# Patient Record
Sex: MALE | Race: WHITE | URBAN - NONMETROPOLITAN AREA
[De-identification: names, ages, dates, MRNs, and addresses within clinical notes are randomized per-mention and may not be internally consistent; named-entity substitution may affect disease eponyms.]

---

## 2017-01-03 ENCOUNTER — OFFICE VISIT (OUTPATIENT)
Dept: CARDIOLOGY CLINIC | Age: 82
End: 2017-01-03

## 2017-01-03 VITALS
HEART RATE: 76 BPM | HEIGHT: 66 IN | WEIGHT: 257 LBS | BODY MASS INDEX: 41.3 KG/M2 | DIASTOLIC BLOOD PRESSURE: 60 MMHG | SYSTOLIC BLOOD PRESSURE: 138 MMHG

## 2017-01-03 DIAGNOSIS — E78.5 HYPERLIPIDEMIA, UNSPECIFIED HYPERLIPIDEMIA TYPE: Primary | Chronic | ICD-10-CM

## 2017-01-03 PROCEDURE — 99214 OFFICE O/P EST MOD 30 MIN: CPT | Performed by: INTERNAL MEDICINE

## 2017-03-07 ENCOUNTER — HOSPITAL ENCOUNTER (OUTPATIENT)
Dept: OTHER | Age: 82
Discharge: HOME | End: 2017-03-07

## 2017-03-07 DIAGNOSIS — Z00.6 CLINICAL TRIAL EXAM: ICD-10-CM

## 2017-03-07 LAB
A/G RATIO: 1.8 (ref 1.1–2.2)
ALBUMIN SERPL-MCNC: 4.2 G/DL (ref 3.4–5)
ALP BLD-CCNC: 87 U/L (ref 40–129)
ALT SERPL-CCNC: 20 U/L (ref 10–40)
ANION GAP SERPL CALCULATED.3IONS-SCNC: 16 MMOL/L (ref 3–16)
AST SERPL-CCNC: 18 U/L (ref 15–37)
BASOPHILS ABSOLUTE: 0 K/UL (ref 0–0.2)
BASOPHILS RELATIVE PERCENT: 0.6 %
BILIRUB SERPL-MCNC: 0.7 MG/DL (ref 0–1)
BILIRUBIN URINE: NEGATIVE
BLOOD, URINE: NEGATIVE
BUN BLDV-MCNC: 14 MG/DL (ref 7–20)
CALCIUM SERPL-MCNC: 9.4 MG/DL (ref 8.3–10.6)
CHLORIDE BLD-SCNC: 99 MMOL/L (ref 99–110)
CHOLESTEROL, TOTAL: 123 MG/DL (ref 0–199)
CLARITY: CLEAR
CO2: 24 MMOL/L (ref 21–32)
COLOR: YELLOW
CREAT SERPL-MCNC: 1 MG/DL (ref 0.8–1.3)
EOSINOPHILS ABSOLUTE: 0.2 K/UL (ref 0–0.6)
EOSINOPHILS RELATIVE PERCENT: 2.9 %
GFR AFRICAN AMERICAN: >60
GFR NON-AFRICAN AMERICAN: >60
GLOBULIN: 2.3 G/DL
GLUCOSE BLD-MCNC: 124 MG/DL (ref 70–99)
GLUCOSE URINE: NEGATIVE MG/DL
HCT VFR BLD CALC: 40.6 % (ref 40.5–52.5)
HDLC SERPL-MCNC: 58 MG/DL (ref 40–60)
HEMOGLOBIN: 13.7 G/DL (ref 13.5–17.5)
KETONES, URINE: NEGATIVE MG/DL
LDL CHOLESTEROL CALCULATED: 41 MG/DL
LEUKOCYTE ESTERASE, URINE: NEGATIVE
LYMPHOCYTES ABSOLUTE: 2 K/UL (ref 1–5.1)
LYMPHOCYTES RELATIVE PERCENT: 30.9 %
MCH RBC QN AUTO: 30.8 PG (ref 26–34)
MCHC RBC AUTO-ENTMCNC: 33.6 G/DL (ref 31–36)
MCV RBC AUTO: 91.7 FL (ref 80–100)
MICROSCOPIC EXAMINATION: NORMAL
MONOCYTES ABSOLUTE: 0.4 K/UL (ref 0–1.3)
MONOCYTES RELATIVE PERCENT: 6.8 %
NEUTROPHILS ABSOLUTE: 3.8 K/UL (ref 1.7–7.7)
NEUTROPHILS RELATIVE PERCENT: 58.8 %
NITRITE, URINE: NEGATIVE
PDW BLD-RTO: 13.7 % (ref 12.4–15.4)
PH UA: 6
PLATELET # BLD: 230 K/UL (ref 135–450)
PMV BLD AUTO: 8.7 FL (ref 5–10.5)
POTASSIUM SERPL-SCNC: 4.2 MMOL/L (ref 3.5–5.1)
PROSTATE SPECIFIC ANTIGEN: 7.67 NG/ML (ref 0–4)
PROTEIN UA: NEGATIVE MG/DL
RBC # BLD: 4.43 M/UL (ref 4.2–5.9)
SODIUM BLD-SCNC: 139 MMOL/L (ref 136–145)
SPECIFIC GRAVITY UA: 1.02
TOTAL PROTEIN: 6.5 G/DL (ref 6.4–8.2)
TRIGL SERPL-MCNC: 118 MG/DL (ref 0–150)
URINE TYPE: NORMAL
UROBILINOGEN, URINE: 1 E.U./DL
VLDLC SERPL CALC-MCNC: 24 MG/DL
WBC # BLD: 6.4 K/UL (ref 4–11)

## 2017-03-08 LAB
ESTIMATED AVERAGE GLUCOSE: 137 MG/DL
HBA1C MFR BLD: 6.4 %

## 2017-09-25 ENCOUNTER — OFFICE VISIT (OUTPATIENT)
Dept: CARDIOLOGY CLINIC | Age: 82
End: 2017-09-25

## 2017-09-25 VITALS
HEART RATE: 68 BPM | BODY MASS INDEX: 41.12 KG/M2 | WEIGHT: 262 LBS | DIASTOLIC BLOOD PRESSURE: 68 MMHG | SYSTOLIC BLOOD PRESSURE: 138 MMHG | HEIGHT: 67 IN

## 2017-09-25 DIAGNOSIS — I10 ESSENTIAL HYPERTENSION: Chronic | ICD-10-CM

## 2017-09-25 DIAGNOSIS — I25.10 CORONARY ARTERY DISEASE INVOLVING NATIVE CORONARY ARTERY OF NATIVE HEART WITHOUT ANGINA PECTORIS: Primary | Chronic | ICD-10-CM

## 2017-09-25 DIAGNOSIS — E78.2 MIXED HYPERLIPIDEMIA: Chronic | ICD-10-CM

## 2017-09-25 PROCEDURE — G8598 ASA/ANTIPLAT THER USED: HCPCS | Performed by: INTERNAL MEDICINE

## 2017-09-25 PROCEDURE — G8427 DOCREV CUR MEDS BY ELIG CLIN: HCPCS | Performed by: INTERNAL MEDICINE

## 2017-09-25 PROCEDURE — G8417 CALC BMI ABV UP PARAM F/U: HCPCS | Performed by: INTERNAL MEDICINE

## 2017-09-25 PROCEDURE — 4040F PNEUMOC VAC/ADMIN/RCVD: CPT | Performed by: INTERNAL MEDICINE

## 2017-09-25 PROCEDURE — 1036F TOBACCO NON-USER: CPT | Performed by: INTERNAL MEDICINE

## 2017-09-25 PROCEDURE — 99213 OFFICE O/P EST LOW 20 MIN: CPT | Performed by: INTERNAL MEDICINE

## 2017-09-25 PROCEDURE — 1123F ACP DISCUSS/DSCN MKR DOCD: CPT | Performed by: INTERNAL MEDICINE

## 2017-09-25 RX ORDER — AMLODIPINE BESYLATE 5 MG/1
5 TABLET ORAL DAILY
Qty: 30 TABLET | Refills: 11
Start: 2017-09-25 | End: 2021-05-07

## 2017-09-25 RX ORDER — LISINOPRIL 20 MG/1
20 TABLET ORAL DAILY
Qty: 30 TABLET | Refills: 11
Start: 2017-09-25

## 2018-03-07 ENCOUNTER — HOSPITAL ENCOUNTER (OUTPATIENT)
Dept: OTHER | Age: 83
Discharge: HOME | End: 2018-03-07

## 2018-03-07 DIAGNOSIS — Z00.6 CLINICAL TRIAL EXAM: ICD-10-CM

## 2018-03-07 LAB
A/G RATIO: 2 (ref 1.1–2.2)
ALBUMIN SERPL-MCNC: 4.2 G/DL (ref 3.4–5)
ALP BLD-CCNC: 90 U/L (ref 40–129)
ALT SERPL-CCNC: 16 U/L (ref 10–40)
ANION GAP SERPL CALCULATED.3IONS-SCNC: 12 MMOL/L (ref 3–16)
AST SERPL-CCNC: 15 U/L (ref 15–37)
BASOPHILS ABSOLUTE: 0 K/UL (ref 0–0.2)
BASOPHILS RELATIVE PERCENT: 0.5 %
BILIRUB SERPL-MCNC: 0.6 MG/DL (ref 0–1)
BILIRUBIN URINE: NEGATIVE
BLOOD, URINE: NEGATIVE
BUN BLDV-MCNC: 12 MG/DL (ref 7–20)
CALCIUM SERPL-MCNC: 9.1 MG/DL (ref 8.3–10.6)
CHLORIDE BLD-SCNC: 102 MMOL/L (ref 99–110)
CHOLESTEROL, TOTAL: 129 MG/DL (ref 0–199)
CLARITY: CLEAR
CO2: 26 MMOL/L (ref 21–32)
COLOR: YELLOW
CREAT SERPL-MCNC: 0.8 MG/DL (ref 0.8–1.3)
EOSINOPHILS ABSOLUTE: 0.2 K/UL (ref 0–0.6)
EOSINOPHILS RELATIVE PERCENT: 2.6 %
GFR AFRICAN AMERICAN: >60
GFR NON-AFRICAN AMERICAN: >60
GLOBULIN: 2.1 G/DL
GLUCOSE BLD-MCNC: 135 MG/DL (ref 70–99)
GLUCOSE URINE: NEGATIVE MG/DL
HCT VFR BLD CALC: 39.9 % (ref 40.5–52.5)
HDLC SERPL-MCNC: 68 MG/DL (ref 40–60)
HEMOGLOBIN: 13.6 G/DL (ref 13.5–17.5)
KETONES, URINE: NEGATIVE MG/DL
LDL CHOLESTEROL CALCULATED: 43 MG/DL
LEUKOCYTE ESTERASE, URINE: NEGATIVE
LYMPHOCYTES ABSOLUTE: 2.1 K/UL (ref 1–5.1)
LYMPHOCYTES RELATIVE PERCENT: 32 %
MCH RBC QN AUTO: 32.2 PG (ref 26–34)
MCHC RBC AUTO-ENTMCNC: 34.2 G/DL (ref 31–36)
MCV RBC AUTO: 94.1 FL (ref 80–100)
MICROSCOPIC EXAMINATION: NORMAL
MONOCYTES ABSOLUTE: 0.4 K/UL (ref 0–1.3)
MONOCYTES RELATIVE PERCENT: 6.7 %
NEUTROPHILS ABSOLUTE: 3.9 K/UL (ref 1.7–7.7)
NEUTROPHILS RELATIVE PERCENT: 58.2 %
NITRITE, URINE: NEGATIVE
PDW BLD-RTO: 14.8 % (ref 12.4–15.4)
PH UA: 7
PLATELET # BLD: 284 K/UL (ref 135–450)
PMV BLD AUTO: 8.5 FL (ref 5–10.5)
POTASSIUM SERPL-SCNC: 4.5 MMOL/L (ref 3.5–5.1)
PROSTATE SPECIFIC ANTIGEN: 11.79 NG/ML (ref 0–4)
PROTEIN UA: NEGATIVE MG/DL
RBC # BLD: 4.23 M/UL (ref 4.2–5.9)
SODIUM BLD-SCNC: 140 MMOL/L (ref 136–145)
SPECIFIC GRAVITY UA: 1.02
TOTAL PROTEIN: 6.3 G/DL (ref 6.4–8.2)
TRIGL SERPL-MCNC: 90 MG/DL (ref 0–150)
URINE TYPE: NORMAL
UROBILINOGEN, URINE: 1 E.U./DL
VLDLC SERPL CALC-MCNC: 18 MG/DL
WBC # BLD: 6.7 K/UL (ref 4–11)

## 2018-03-08 LAB
ESTIMATED AVERAGE GLUCOSE: 131.2 MG/DL
HBA1C MFR BLD: 6.2 %

## 2018-03-30 ENCOUNTER — OFFICE VISIT (OUTPATIENT)
Dept: CARDIOLOGY CLINIC | Age: 83
End: 2018-03-30

## 2018-03-30 VITALS
DIASTOLIC BLOOD PRESSURE: 68 MMHG | WEIGHT: 263.1 LBS | SYSTOLIC BLOOD PRESSURE: 126 MMHG | HEART RATE: 61 BPM | BODY MASS INDEX: 41.29 KG/M2 | HEIGHT: 67 IN

## 2018-03-30 DIAGNOSIS — Z01.818 PREOPERATIVE CLEARANCE: Primary | ICD-10-CM

## 2018-03-30 DIAGNOSIS — I25.10 CORONARY ARTERY DISEASE INVOLVING NATIVE CORONARY ARTERY OF NATIVE HEART WITHOUT ANGINA PECTORIS: Chronic | ICD-10-CM

## 2018-03-30 DIAGNOSIS — Z01.810 PREOP CARDIOVASCULAR EXAM: ICD-10-CM

## 2018-03-30 DIAGNOSIS — I10 ESSENTIAL HYPERTENSION: Chronic | ICD-10-CM

## 2018-03-30 DIAGNOSIS — E78.2 MIXED HYPERLIPIDEMIA: Chronic | ICD-10-CM

## 2018-03-30 DIAGNOSIS — R06.02 SHORTNESS OF BREATH: ICD-10-CM

## 2018-03-30 PROCEDURE — G8427 DOCREV CUR MEDS BY ELIG CLIN: HCPCS | Performed by: INTERNAL MEDICINE

## 2018-03-30 PROCEDURE — 4040F PNEUMOC VAC/ADMIN/RCVD: CPT | Performed by: INTERNAL MEDICINE

## 2018-03-30 PROCEDURE — G8598 ASA/ANTIPLAT THER USED: HCPCS | Performed by: INTERNAL MEDICINE

## 2018-03-30 PROCEDURE — 1036F TOBACCO NON-USER: CPT | Performed by: INTERNAL MEDICINE

## 2018-03-30 PROCEDURE — 99215 OFFICE O/P EST HI 40 MIN: CPT | Performed by: INTERNAL MEDICINE

## 2018-03-30 PROCEDURE — 1123F ACP DISCUSS/DSCN MKR DOCD: CPT | Performed by: INTERNAL MEDICINE

## 2018-03-30 PROCEDURE — G8417 CALC BMI ABV UP PARAM F/U: HCPCS | Performed by: INTERNAL MEDICINE

## 2018-03-30 PROCEDURE — 93000 ELECTROCARDIOGRAM COMPLETE: CPT | Performed by: INTERNAL MEDICINE

## 2018-03-30 PROCEDURE — G8484 FLU IMMUNIZE NO ADMIN: HCPCS | Performed by: INTERNAL MEDICINE

## 2018-03-30 RX ORDER — AMLODIPINE BESYLATE 5 MG/1
10 TABLET ORAL DAILY
Qty: 180 TABLET | Refills: 3 | Status: CANCELLED | OUTPATIENT
Start: 2018-03-30

## 2018-03-30 RX ORDER — LISINOPRIL 20 MG/1
40 TABLET ORAL DAILY
Qty: 180 TABLET | Refills: 3 | Status: CANCELLED | OUTPATIENT
Start: 2018-03-30

## 2018-03-30 RX ORDER — LANOLIN ALCOHOL/MO/W.PET/CERES
1000 CREAM (GRAM) TOPICAL DAILY
COMMUNITY

## 2018-03-30 RX ORDER — FERROUS SULFATE 325(65) MG
325 TABLET ORAL
COMMUNITY

## 2018-03-30 NOTE — COMMUNICATION BODY
anxiety, no depression. · Endocrine: No malaise, fatigue or temperature intolerance. No excessive thirst, fluid intake, or urination. No tremor. · Hematologic/Lymphatic: No abnormal bruising or bleeding, blood clots or swollen lymph nodes. · Allergic/Immunologic: No nasal congestion or hives. Physical Examination:    Vitals:    03/30/18 0800   BP: 126/68   Pulse: 61          Constitutional and General Appearance:   . NAD   SKIN:  .     Warm and dry  EYES:    .     EOMI  Neck:   . Normal carotid contour  Respiratory:  · Normal excursion and expansion without use of accessory muscles  · Resp Auscultation: Normal breath sounds without dullness  Cardiovascular:  · The apical impulses not displaced  · Heart tones are crisp and normal  · Cervical veins are not engorged  · Normal S1S2, No S3, ANDREW-LVOT  · Peripheral pulses are symmetrical and full  Extremities:  · There is no clubbing, cyanosis of the extremities. · No edema  · Femoral Arteries: 2+ and equal  · Pedal Pulses: 2+ and equal   Abdomen:  · No masses or tenderness  · Liver/Spleen: No Abnormalities Noted  Neurological/Psychiatric:  · Alert and oriented in all spheres  · Moves all extremities well  · Exhibits normal gait balance and coordination  · No abnormalities of mood, affect, memory, mentation, or behavior are noted    7/14-US of Abd--  midline structures, including the common bowel duct, pancreas,   and aorta are obscured by artifact from overlying bowel gas and mesh   hernia repair     2012 echo AV sclerosis mod LVH  RVSP 29 mmHg    Assessment:   Problem: Coronary artery disease  1998, cardiac catheterization. 40% distal right coronary lesion, 60% mid left anterior descending lesion, 70% diagonal lesion, normal left ventricular systolic pressure  November, 2009 Myoview stress test>  5.8 min. Cristobal protocol. LVEF 77%.   Myocardial perfusion was normal.   8/12 myoview-normal, with normal EF    Problem: Hypertension  /68 (Site: Left Arm, Position: Sitting, Cuff Size: Large Adult)   Pulse 61   Ht 5' 7\" (1.702 m)   Wt 263 lb 1.6 oz (119.3 kg)   BMI 41.21 kg/m²         Problem: Dyslipidemia  3/19 tc 129  Tri 90 htl 68 ldl 43  Take coenzyme q 10 400 mg daily to decrease fatigue and aches that could be caused by your statin. This can be found at any local pharmacy, no prescription needed      Plan  Stress Myoview--If nonischemic, OK to proceed with prostate surgery  Echo-- Evaluate systolic murmur  Co Q trial  OV X 12 months    Thank you for allowing me to participate in the care of this individual.      Ernesto Salomon M.D., [facility] [place].

## 2018-04-02 ENCOUNTER — HOSPITAL ENCOUNTER (OUTPATIENT)
Dept: NON INVASIVE DIAGNOSTICS | Age: 83
Discharge: HOME | End: 2018-04-02
Attending: INTERNAL MEDICINE | Admitting: INTERNAL MEDICINE

## 2018-04-02 DIAGNOSIS — R06.02 SHORTNESS OF BREATH: ICD-10-CM

## 2018-04-02 LAB
LEFT VENTRICULAR EJECTION FRACTION HIGH VALUE: 55 %
LEFT VENTRICULAR EJECTION FRACTION MODE: NORMAL
LV EF: 55 %
LV EF: 66 %
LVEF MODALITY: NORMAL
LVEF MODALITY: NORMAL

## 2018-04-29 PROBLEM — Z01.818 PREOPERATIVE CLEARANCE: Status: RESOLVED | Noted: 2018-03-30 | Resolved: 2018-04-29

## 2019-05-02 NOTE — PROGRESS NOTES
differently: Take 10 mg by mouth daily ) 30 tablet 11    fluticasone (FLONASE) 50 MCG/ACT nasal spray 1 spray by Nasal route as needed for Rhinitis      atorvastatin (LIPITOR) 10 MG tablet Take 0.5 tablets by mouth every other day 30 tablet 0    isosorbide mononitrate (IMDUR) 60 MG CR tablet Take 60 mg by mouth daily.  furosemide (LASIX) 20 MG tablet Take 1 tablet by mouth daily. 90 tablet 3    terazosin (HYTRIN) 10 MG capsule Take 10 mg by mouth nightly.  triamcinolone (NASACORT AQ) 55 MCG/ACT nasal inhaler 2 sprays by Nasal route daily.  clopidogrel (PLAVIX) 75 MG tablet Take 75 mg by mouth daily.  metformin (GLUCOPHAGE) 500 MG tablet Take 500 mg by mouth every evening.  labetalol (NORMODYNE) 200 MG tablet Take 200 mg by mouth 2 times daily. No facility-administered encounter medications on file as of 5/3/2019. Allergies:  Penicillins     Review of Systems:   · Constitutional: there has been no unanticipated weight loss. There's been no change in energy level, sleep pattern, or activity level. · Eyes: No visual changes or diplopia. No scleral icterus. · ENT: No Headaches, hearing loss or vertigo. No mouth sores or sore throat. · Cardiovascular: Reviewed in HPI  · Respiratory: No cough or wheezing, no sputum production. No hematemesis. · Gastrointestinal: No abdominal pain, appetite loss, blood in stools. No change in bowel or bladder habits. · Genitourinary: No dysuria, trouble voiding, or hematuria. · Musculoskeletal:  No gait disturbance, weakness or joint complaints. · Integumentary: No rash or pruritis. · Neurological: No headache, diplopia, change in muscle strength, numbness or tingling. No change in gait, balance, coordination, mood, affect, memory, mentation, behavior. · Psychiatric: No anxiety, no depression. · Endocrine: No malaise, fatigue or temperature intolerance. No excessive thirst, fluid intake, or urination.  No tremor. · Hematologic/Lymphatic: No abnormal bruising or bleeding, blood clots or swollen lymph nodes. · Allergic/Immunologic: No nasal congestion or hives. Physical Examination:    Vitals:    05/03/19 0842   BP: 136/69   Pulse: 62          Constitutional and General Appearance:   . NAD   SKIN:  .     Warm and dry  EYES:    .     EOMI  Neck:   . Normal carotid contour  Respiratory:  Normal excursion and expansion without use of accessory muscles  Resp Auscultation: Normal breath sounds without dullness  Cardiovascular: The apical impulses not displaced  Heart tones are crisp and normal  Cervical veins are not engorged  Normal Y9Q2, No S3, systolic  Murmur  Peripheral pulses are symmetrical and full  Extremities: There is no clubbing, cyanosis of the extremities. No edema  Femoral Arteries: 2+ and equal  Pedal Pulses: 2+ and equal   Abdomen:  No masses or tenderness  Liver/Spleen: No Abnormalities Noted  Neurological/Psychiatric:  Alert and oriented in all spheres  Moves all extremities well  Exhibits normal gait balance and coordination  No abnormalities of mood, affect, memory    All testing and labs listed below were personally reviewed. 7/14-US of Abd--  midline structures, including the common bowel duct, pancreas,   and aorta are obscured by artifact from overlying bowel gas and mesh   hernia repair     2012 echo AV sclerosis mod LVH  RVSP 29 mmHg    Assessment:   CAD  1998, cardiac catheterization. 40% distal right coronary lesion, 60% mid left anterior descending lesion, 70% diagonal lesion, normal left ventricular systolic pressure  November, 2009 Myoview stress test>  5.8 min. Cristobal protocol. LVEF 77%.   Myocardial perfusion was normal.   4/2/18   myoview-normal, with normal EF  Stable,  Tolerating plavix and asa without  bleeding    HTN  /69 (Site: Left Upper Arm, Position: Sitting, Cuff Size: Medium Adult)   Pulse 62   Ht 5' 6\" (1.676 m)   Wt 270 lb (122.5 kg)   BMI 43.58 kg/m²    stable  Tolerating lisinopril and norvasc well         hchol  3/19  Tc 123  Tri 110  hdl 53 ldl 48  Liver enzymes stable  On lipitor 10 mg every other day  Stable, tolerating well      Aortic sclerosis  Systolic murmer  5953--[id number] aortic valve  Stable, no changes    Plan  Stable cardiac status  Continue all medications  Patient has been advised on the importance of regular exercise of at least 20-30 minutes daily alternating with aerobic and isometric activities. Follow up in one year    Thank you for allowing me to participate in the care of this individual.      Yulissa Boss M.D., [facility]. Scribe Attestation:  Scribe Attestation:  Heath Epperson am scribing for and in the presence of Anand Cadena MD.   Andrew Cody 05/03/19 9:00 AM   Provider Neha Moreira is working as a scribe for and in the presence of cecilio Cadena MD). Working as a scribe, Olga Marrykeli may have prepopulated components of this note with my historical  intellectual property under my direct supervision. Any additions to this intellectual property were performed in my presence and at my direction. Furthermore, the content and accuracy of this note have been reviewed by cecilio Cadena MD).

## 2019-05-03 ENCOUNTER — OFFICE VISIT (OUTPATIENT)
Dept: CARDIOLOGY CLINIC | Age: 84
End: 2019-05-03
Payer: MEDICARE

## 2019-05-03 VITALS
SYSTOLIC BLOOD PRESSURE: 136 MMHG | HEART RATE: 62 BPM | WEIGHT: 270 LBS | DIASTOLIC BLOOD PRESSURE: 69 MMHG | BODY MASS INDEX: 43.39 KG/M2 | HEIGHT: 66 IN

## 2019-05-03 DIAGNOSIS — I35.8 AORTIC VALVE SCLEROSIS: ICD-10-CM

## 2019-05-03 DIAGNOSIS — E78.2 MIXED HYPERLIPIDEMIA: ICD-10-CM

## 2019-05-03 DIAGNOSIS — I25.10 CORONARY ARTERY DISEASE INVOLVING NATIVE CORONARY ARTERY OF NATIVE HEART WITHOUT ANGINA PECTORIS: Primary | ICD-10-CM

## 2019-05-03 DIAGNOSIS — I10 ESSENTIAL HYPERTENSION: ICD-10-CM

## 2019-05-03 PROCEDURE — G8598 ASA/ANTIPLAT THER USED: HCPCS | Performed by: INTERNAL MEDICINE

## 2019-05-03 PROCEDURE — 4040F PNEUMOC VAC/ADMIN/RCVD: CPT | Performed by: INTERNAL MEDICINE

## 2019-05-03 PROCEDURE — 1036F TOBACCO NON-USER: CPT | Performed by: INTERNAL MEDICINE

## 2019-05-03 PROCEDURE — 99213 OFFICE O/P EST LOW 20 MIN: CPT | Performed by: INTERNAL MEDICINE

## 2019-05-03 PROCEDURE — G8427 DOCREV CUR MEDS BY ELIG CLIN: HCPCS | Performed by: INTERNAL MEDICINE

## 2019-05-03 PROCEDURE — 1123F ACP DISCUSS/DSCN MKR DOCD: CPT | Performed by: INTERNAL MEDICINE

## 2019-05-03 PROCEDURE — G8417 CALC BMI ABV UP PARAM F/U: HCPCS | Performed by: INTERNAL MEDICINE

## 2019-05-03 RX ORDER — GABAPENTIN 100 MG/1
100 CAPSULE ORAL 2 TIMES DAILY
COMMUNITY
Start: 2019-04-09 | End: 2021-05-07

## 2019-05-03 NOTE — LETTER
 Multiple Vitamins-Minerals (MULTIVITAL) TABS Take by mouth      vitamin B-12 (CYANOCOBALAMIN) 1000 MCG tablet Take 1,000 mcg by mouth daily      lisinopril (PRINIVIL;ZESTRIL) 20 MG tablet Take 1 tablet by mouth daily (Patient taking differently: Take 40 mg by mouth daily ) 30 tablet 11    amLODIPine (NORVASC) 5 MG tablet Take 1 tablet by mouth daily (Patient taking differently: Take 10 mg by mouth daily ) 30 tablet 11    fluticasone (FLONASE) 50 MCG/ACT nasal spray 1 spray by Nasal route as needed for Rhinitis      atorvastatin (LIPITOR) 10 MG tablet Take 0.5 tablets by mouth every other day 30 tablet 0    isosorbide mononitrate (IMDUR) 60 MG CR tablet Take 60 mg by mouth daily.  furosemide (LASIX) 20 MG tablet Take 1 tablet by mouth daily. 90 tablet 3    terazosin (HYTRIN) 10 MG capsule Take 10 mg by mouth nightly.  triamcinolone (NASACORT AQ) 55 MCG/ACT nasal inhaler 2 sprays by Nasal route daily.  clopidogrel (PLAVIX) 75 MG tablet Take 75 mg by mouth daily.  metformin (GLUCOPHAGE) 500 MG tablet Take 500 mg by mouth every evening.  labetalol (NORMODYNE) 200 MG tablet Take 200 mg by mouth 2 times daily. No facility-administered encounter medications on file as of 5/3/2019. Allergies:  Penicillins     Review of Systems:   · Constitutional: there has been no unanticipated weight loss. There's been no change in energy level, sleep pattern, or activity level. · Eyes: No visual changes or diplopia. No scleral icterus. · ENT: No Headaches, hearing loss or vertigo. No mouth sores or sore throat. · Cardiovascular: Reviewed in HPI  · Respiratory: No cough or wheezing, no sputum production. No hematemesis. · Gastrointestinal: No abdominal pain, appetite loss, blood in stools. No change in bowel or bladder habits. · Genitourinary: No dysuria, trouble voiding, or hematuria. · Musculoskeletal:  No gait disturbance, weakness or joint complaints. · Integumentary: No rash or pruritis. · Neurological: No headache, diplopia, change in muscle strength, numbness or tingling. No change in gait, balance, coordination, mood, affect, memory, mentation, behavior. · Psychiatric: No anxiety, no depression. · Endocrine: No malaise, fatigue or temperature intolerance. No excessive thirst, fluid intake, or urination. No tremor. · Hematologic/Lymphatic: No abnormal bruising or bleeding, blood clots or swollen lymph nodes. · Allergic/Immunologic: No nasal congestion or hives. Physical Examination:    Vitals:    05/03/19 0842   BP: 136/69   Pulse: 62          Constitutional and General Appearance:   . NAD   SKIN:  .     Warm and dry  EYES:    .     EOMI  Neck:   . Normal carotid contour  Respiratory:  Normal excursion and expansion without use of accessory muscles  Resp Auscultation: Normal breath sounds without dullness  Cardiovascular: The apical impulses not displaced  Heart tones are crisp and normal  Cervical veins are not engorged  Normal A4F5, No S3, systolic  Murmur  Peripheral pulses are symmetrical and full  Extremities: There is no clubbing, cyanosis of the extremities. No edema  Femoral Arteries: 2+ and equal  Pedal Pulses: 2+ and equal   Abdomen:  No masses or tenderness  Liver/Spleen: No Abnormalities Noted  Neurological/Psychiatric:  Alert and oriented in all spheres  Moves all extremities well  Exhibits normal gait balance and coordination  No abnormalities of mood, affect, memory    All testing and labs listed below were personally reviewed. 7/14-US of Abd--  midline structures, including the common bowel duct, pancreas,   and aorta are obscured by artifact from overlying bowel gas and mesh   hernia repair     2012 echo AV sclerosis mod LVH  RVSP 29 mmHg    Assessment:   CAD  1998, cardiac catheterization.   40% distal right coronary lesion, 60% mid left anterior descending lesion, 70% diagonal lesion, normal left ventricular systolic pressure  November, 2009 Myoview stress test>  5.8 min. Cristobal protocol. LVEF 77%. Myocardial perfusion was normal.   4/2/18   myoview-normal, with normal EF  Stable,  Tolerating plavix and asa without  bleeding    HTN  /69 (Site: Left Upper Arm, Position: Sitting, Cuff Size: Medium Adult)   Pulse 62   Ht 5' 6\" (1.676 m)   Wt 270 lb (122.5 kg)   BMI 43.58 kg/m²     stable  Tolerating lisinopril and norvasc well         hchol  3/19  Tc 123  Tri 110  hdl 53 ldl 48  Liver enzymes stable  On lipitor 10 mg every other day  Stable, tolerating well      Aortic sclerosis  Systolic murmer  2327--IVOOKTODM aortic valve  Stable, no changes    Plan  Stable cardiac status  Continue all medications  Patient has been advised on the importance of regular exercise of at least 20-30 minutes daily alternating with aerobic and isometric activities. Follow up in one year    Thank you for allowing me to participate in the care of this individual.      Anahy Gee M.D., [facility] [place]. Scribe Attestation:  Scribe Attestation:  Vijay Clayton am scribing for and in the presence of Indra Stein MD.   [facility] 05/03/19 9:00 AM   Provider Gabbi Huber is working as a scribe for and in the presence of cecilio Stein MD). Working as a scribe, Rosana Palacios may have prepopulated components of this note with my historical  intellectual property under my direct supervision. Any additions to this intellectual property were performed in my presence and at my direction. Furthermore, the content and accuracy of this note have been reviewed by cecilio Stein MD). If you have questions, please do not hesitate to call me. I look forward to following Alicia Contis along with you.     Sincerely,        Hermann Ramirez MD

## 2019-08-22 ENCOUNTER — OFFICE VISIT (OUTPATIENT)
Dept: ORTHOPEDIC SURGERY | Age: 84
End: 2019-08-22
Payer: MEDICARE

## 2019-08-22 VITALS — WEIGHT: 270.06 LBS | HEIGHT: 66 IN | BODY MASS INDEX: 43.4 KG/M2

## 2019-08-22 DIAGNOSIS — M25.521 RIGHT ELBOW PAIN: Primary | ICD-10-CM

## 2019-08-22 PROCEDURE — 99203 OFFICE O/P NEW LOW 30 MIN: CPT | Performed by: ORTHOPAEDIC SURGERY

## 2019-08-22 PROCEDURE — 1123F ACP DISCUSS/DSCN MKR DOCD: CPT | Performed by: ORTHOPAEDIC SURGERY

## 2019-08-22 PROCEDURE — G8417 CALC BMI ABV UP PARAM F/U: HCPCS | Performed by: ORTHOPAEDIC SURGERY

## 2019-08-22 PROCEDURE — G8427 DOCREV CUR MEDS BY ELIG CLIN: HCPCS | Performed by: ORTHOPAEDIC SURGERY

## 2019-08-22 PROCEDURE — G8598 ASA/ANTIPLAT THER USED: HCPCS | Performed by: ORTHOPAEDIC SURGERY

## 2019-08-22 PROCEDURE — 4040F PNEUMOC VAC/ADMIN/RCVD: CPT | Performed by: ORTHOPAEDIC SURGERY

## 2019-08-22 PROCEDURE — 1036F TOBACCO NON-USER: CPT | Performed by: ORTHOPAEDIC SURGERY

## 2019-08-22 NOTE — PROGRESS NOTES
bilaterally reveals all areas to be without enlargement or induration. Skin intact without lymphadenopathy, discoloration, or abnormal temperature. Well-healed medial elbow incision and palmar and distal volar wrist incision    Vascular: There is intact, symmetric circulation in both upper extremities. Capillary refill brisk in all fingers with 2+ palpable radial pulse  Musculoskeletal/Neurologic           Cervical spine and shoulders      Cervical spine, shoulders, elbows, wrist:  satisfactory comfortable baseline range of motion, strength, and stability  Negative Spurling sign bilateral upper extremity  2+ brachioradialis and triceps tendon reflexes negative Zachery sign bilaterally        Right hand(s) wrist(s)  Satisfactory range of motion bilaterally. There is not muscular atrophy. Wartenberg's sign is not present. Sensation is subjectively decreased, tingling in the Middle Finger, Ring Finger and Small Finger   Negative tinel's, nerve compression, and Phalen's sign at level of the wrist.           Contralateral hand(s) wrist(s) Satisfactory range of motion bilaterally. There is no muscular atrophy. Sensation is subjectively normal in the all fingers and entire hand  Negative tinel's, nerve compression, and Phalen's sign at level of the wrist             Right elbow(s)   Elbow range of motion approximately 5 to 135 degrees of flexion, no evidence of ulnar nerve subluxation or instability throughout elbow range of motion  70 degrees of pronation and supination without pain or mechanical symptoms. Mild positive Tinel's at the elbow with some sensitivity anterior the medial epicondyle. Mild positive elbow flexion test.  Absence of tenderness to palpation of medial epicondyle and common flexor origin. Contralateral elbow: Neg Tinel's at the elbow. Neg elbow flexion test. No pain to palpation.      strength: 59 pounds bilaterally      Radiology:     X-rays obtained and reviewed in see if any changes have occurred compared with last December. Finally, operative intervention could be considered particularly for his ulnar nerve transposition but I did discuss the increased risk of complications as well as less predictable results with repeat procedure particularly with ulnar nerve transposition but if we determined that this would be beneficial we could consider this in the future as well.     The patient has decided at this point he would like to continue to observe and I will plan to see him back in clinic in 6 to 8 weeks for repeat evaluation if he desires

## 2020-04-27 ENCOUNTER — TELEPHONE (OUTPATIENT)
Dept: CARDIOLOGY CLINIC | Age: 85
End: 2020-04-27

## 2020-04-27 NOTE — TELEPHONE ENCOUNTER
Pt asking if he needs blood work prior to 5/8/20 appt.  If so please send order to [facility] lab and call pt to advise

## 2020-05-07 ENCOUNTER — OFFICE VISIT (OUTPATIENT)
Dept: CARDIOLOGY CLINIC | Age: 85
End: 2020-05-07
Payer: MEDICARE

## 2020-05-07 VITALS
HEART RATE: 68 BPM | SYSTOLIC BLOOD PRESSURE: 118 MMHG | DIASTOLIC BLOOD PRESSURE: 60 MMHG | WEIGHT: 267 LBS | BODY MASS INDEX: 42.91 KG/M2 | TEMPERATURE: 97.2 F | HEIGHT: 66 IN

## 2020-05-07 PROCEDURE — 4040F PNEUMOC VAC/ADMIN/RCVD: CPT | Performed by: INTERNAL MEDICINE

## 2020-05-07 PROCEDURE — 93000 ELECTROCARDIOGRAM COMPLETE: CPT | Performed by: INTERNAL MEDICINE

## 2020-05-07 PROCEDURE — 99214 OFFICE O/P EST MOD 30 MIN: CPT | Performed by: INTERNAL MEDICINE

## 2020-05-07 PROCEDURE — G8417 CALC BMI ABV UP PARAM F/U: HCPCS | Performed by: INTERNAL MEDICINE

## 2020-05-07 PROCEDURE — 1123F ACP DISCUSS/DSCN MKR DOCD: CPT | Performed by: INTERNAL MEDICINE

## 2020-05-07 PROCEDURE — G8427 DOCREV CUR MEDS BY ELIG CLIN: HCPCS | Performed by: INTERNAL MEDICINE

## 2020-05-07 PROCEDURE — 1036F TOBACCO NON-USER: CPT | Performed by: INTERNAL MEDICINE

## 2020-05-07 NOTE — PATIENT INSTRUCTIONS
Echo in one year to evaluate AS  LLL  Stop plavix  Start eliquis 5mg twice a day  Call for excess bleeding or bruising  Follow up in one year with [facility]

## 2020-05-07 NOTE — PROGRESS NOTES
[address]   Cardiac Consultation    Referring Provider:  Tracy Marvin MD     Chief Complaint   Patient presents with    1 Year Follow Up     Pt denies cadiac SX.  Coronary Artery Disease    Hyperlipidemia    Hypertension       History of Present Illness:  Mr Esequiel Goldmann is a 82 y.o. male seen as a follow up for CAD,htn,hchol. He has aortic sclerosis     Today he states he is doing well. He has no resting or exertional chest pain, sob palpitations or dizziness. No syncope, swelling or bleeding problems. No routine exercise but is active. Patient is compliant  with medication and is tolerating them well without side effects  Patient has been advised on the importance of regular exercise of at least 20-30 minutes daily alternating with aerobic and isometric activities. Past Medical History:   has a past medical history of CAD (coronary artery disease), Heart murmur, Hypertension, Type II or unspecified type diabetes mellitus without mention of complication, not stated as uncontrolled, and Unspecified sleep apnea. Surgical History:   has a past surgical history that includes Cardiac catheterization; Total knee arthroplasty; hernia repair; Foot surgery; Arm Surgery (01/09/2018); and Carpal tunnel release (Right, 01/2019). Social History:   reports that he has never smoked. He has never used smokeless tobacco. He reports that he does not drink alcohol or use drugs. Family History:  family history includes Heart Disease in his father; Stroke in his mother.      Home Medications:  Outpatient Encounter Medications as of 5/7/2020   Medication Sig Dispense Refill    ferrous sulfate 325 (65 Fe) MG tablet Take 325 mg by mouth daily (with breakfast)      Multiple Vitamins-Minerals (MULTIVITAL) TABS Take by mouth      vitamin B-12 (CYANOCOBALAMIN) 1000 MCG tablet Take 1,000 mcg by mouth daily      lisinopril (PRINIVIL;ZESTRIL) 20 MG tablet Take 1 tablet by mouth daily (Patient taking

## 2020-05-15 LAB
BILIRUBIN DIRECT: 0.3 MG/DL (ref 0–0.2)
CHOLESTEROL, TOTAL: 111 MG/DL
HDLC SERPL-MCNC: 45 MG/DL
LDL CHOLESTEROL CALCULATED: 46 MG/DL
NONHDLC SERPL-MCNC: 66 MG/DL
PHOSPHORUS: 3.2 MG/DL (ref 2.5–4.5)
TRIGL SERPL-MCNC: 99 MG/DL

## 2020-05-21 ENCOUNTER — TELEPHONE (OUTPATIENT)
Dept: CARDIOLOGY CLINIC | Age: 85
End: 2020-05-21

## 2020-07-29 ENCOUNTER — TELEPHONE (OUTPATIENT)
Dept: CARDIOLOGY CLINIC | Age: 85
End: 2020-07-29

## 2020-07-29 NOTE — TELEPHONE ENCOUNTER
Pt calling he was put on Eliquis and he thinks his blood is too thin. He went to get labs done yesterday for Cancer Dr and when the lady pulled the needle he bleed a lot pt states that isn't normal for him. Needs to know what to do?  Pls call to advise Thank you

## 2020-07-29 NOTE — TELEPHONE ENCOUNTER
Called and discussed with patient--no active bleeding--just bled for a few minutes after blood draw. Discussed risk/benefit of Rx--He will monitor for signs of additional or active bleeding.

## 2020-10-01 ENCOUNTER — TELEPHONE (OUTPATIENT)
Dept: CARDIOLOGY CLINIC | Age: 85
End: 2020-10-01

## 2020-10-01 NOTE — TELEPHONE ENCOUNTER
Asking if it is ok for him to drive to [place] at the end of the month , he is on eliquis . He knows cate is not in office until next week . He will expect a call then .

## 2020-10-20 RX ORDER — APIXABAN 5 MG/1
TABLET, FILM COATED ORAL
Qty: 180 TABLET | Refills: 0 | Status: SHIPPED | OUTPATIENT
Start: 2020-10-20

## 2021-01-12 ENCOUNTER — TELEPHONE (OUTPATIENT)
Dept: CARDIOLOGY CLINIC | Age: 86
End: 2021-01-12

## 2021-01-12 DIAGNOSIS — I25.10 CORONARY ARTERY DISEASE DUE TO LIPID RICH PLAQUE: Primary | ICD-10-CM

## 2021-01-12 DIAGNOSIS — I25.83 CORONARY ARTERY DISEASE DUE TO LIPID RICH PLAQUE: Primary | ICD-10-CM

## 2021-01-12 NOTE — TELEPHONE ENCOUNTER
Received refill request for Eliquis from Francia Christiansen. Called patient. He states his PCP already filled it for him. He will be seeing Dr Walt Reinoso in May. He will need cbc and bmp prior to appt. Patient verbalized understanding. Mailed lab results to home address.

## 2021-05-07 ENCOUNTER — PROCEDURE VISIT (OUTPATIENT)
Dept: CARDIOLOGY CLINIC | Age: 86
End: 2021-05-07
Payer: MEDICARE

## 2021-05-07 ENCOUNTER — OFFICE VISIT (OUTPATIENT)
Dept: CARDIOLOGY CLINIC | Age: 86
End: 2021-05-07
Payer: MEDICARE

## 2021-05-07 VITALS
SYSTOLIC BLOOD PRESSURE: 116 MMHG | DIASTOLIC BLOOD PRESSURE: 56 MMHG | HEIGHT: 66 IN | HEART RATE: 54 BPM | BODY MASS INDEX: 41.3 KG/M2 | WEIGHT: 257 LBS

## 2021-05-07 DIAGNOSIS — I10 ESSENTIAL HYPERTENSION: Chronic | ICD-10-CM

## 2021-05-07 DIAGNOSIS — I10 HYPERTENSION, UNSPECIFIED TYPE: ICD-10-CM

## 2021-05-07 DIAGNOSIS — I70.90 AS (ATHEROSCLEROSIS): ICD-10-CM

## 2021-05-07 DIAGNOSIS — I48.91 ATRIAL FIBRILLATION, UNSPECIFIED TYPE (HCC): ICD-10-CM

## 2021-05-07 DIAGNOSIS — I25.10 CORONARY ARTERY DISEASE INVOLVING NATIVE HEART WITHOUT ANGINA PECTORIS, UNSPECIFIED VESSEL OR LESION TYPE: ICD-10-CM

## 2021-05-07 DIAGNOSIS — I35.9 AORTIC VALVE DISEASE: Primary | ICD-10-CM

## 2021-05-07 DIAGNOSIS — I25.10 CORONARY ARTERY DISEASE INVOLVING NATIVE CORONARY ARTERY OF NATIVE HEART WITHOUT ANGINA PECTORIS: Chronic | ICD-10-CM

## 2021-05-07 DIAGNOSIS — E78.2 MIXED HYPERLIPIDEMIA: Chronic | ICD-10-CM

## 2021-05-07 PROBLEM — I34.0 NONRHEUMATIC MITRAL VALVE REGURGITATION: Status: ACTIVE | Noted: 2021-05-07

## 2021-05-07 PROBLEM — I27.20 PULMONARY HTN (HCC): Status: ACTIVE | Noted: 2021-05-07

## 2021-05-07 LAB
LV EF: 55 %
LVEF MODALITY: NORMAL

## 2021-05-07 PROCEDURE — 93306 TTE W/DOPPLER COMPLETE: CPT | Performed by: INTERNAL MEDICINE

## 2021-05-07 PROCEDURE — 99214 OFFICE O/P EST MOD 30 MIN: CPT | Performed by: INTERNAL MEDICINE

## 2021-05-07 PROCEDURE — G8427 DOCREV CUR MEDS BY ELIG CLIN: HCPCS | Performed by: INTERNAL MEDICINE

## 2021-05-07 PROCEDURE — 4040F PNEUMOC VAC/ADMIN/RCVD: CPT | Performed by: INTERNAL MEDICINE

## 2021-05-07 PROCEDURE — 1123F ACP DISCUSS/DSCN MKR DOCD: CPT | Performed by: INTERNAL MEDICINE

## 2021-05-07 PROCEDURE — G8417 CALC BMI ABV UP PARAM F/U: HCPCS | Performed by: INTERNAL MEDICINE

## 2021-05-07 PROCEDURE — 1036F TOBACCO NON-USER: CPT | Performed by: INTERNAL MEDICINE

## 2021-05-07 RX ORDER — UBIDECARENONE 100 MG
100 CAPSULE ORAL DAILY
COMMUNITY

## 2021-05-07 RX ORDER — AMLODIPINE BESYLATE 5 MG/1
5 TABLET ORAL NIGHTLY
Qty: 90 TABLET | Refills: 4 | Status: SHIPPED | OUTPATIENT
Start: 2021-05-07

## 2021-05-07 RX ORDER — AMLODIPINE BESYLATE 5 MG/1
5 TABLET ORAL NIGHTLY
Qty: 30 TABLET | Refills: 11
Start: 2021-05-07 | End: 2021-05-07 | Stop reason: SDUPTHER

## 2021-05-07 NOTE — PATIENT INSTRUCTIONS
Decrease Norvasc to 5 mg nightly  Take Lasix 40 mg twice a day for today and tomorrow then continue your 40 mg daily  Monitor BP at home  Limit sodium intake to help swelling  Follow up in 3 months    Think about getting re evaluated for sleep apnea

## 2021-05-07 NOTE — PROGRESS NOTES
[address]  Cardiac Consult     Referring Provider:  Fredo Johnson MD     Chief Complaint   Patient presents with    Follow-up    Coronary Artery Disease    Atrial Fibrillation    Other     echo today for aortic sclerosis        History of Present Illness:  82 y.o. male formally followed by Dr. Alfie Hernandez seen in f/u for CAD, HTN, and afib. He was last seen by Dr. Alfie Hernandez 1 year ago. He was found to be in new, asymptomatic afib. He was placed on eliquis for anticoagulation. He returns today with an ECHO to evaluated aortic valve disease. He is doing well in general. Lives alone. Eats meals out. Diagnosed with sleep apnea years ago. \"Went away with gastric bypass\". Complains of poor energy, fatigue and edema. ECHO today with normal LV function. Right heart slightly enlarged with moderate TR and moderate PAH. Past Medical History:   has a past medical history of CAD (coronary artery disease), Heart murmur, Hypertension, Type II or unspecified type diabetes mellitus without mention of complication, not stated as uncontrolled, and Unspecified sleep apnea. Surgical History:   has a past surgical history that includes Cardiac catheterization; Total knee arthroplasty; hernia repair; Foot surgery; Arm Surgery (01/09/2018); and Carpal tunnel release (Right, 01/2019). Social History:  Social History     Tobacco Use    Smoking status: Former Smoker    Smokeless tobacco: Never Used    Tobacco comment: quit 60 yrs ago   Substance Use Topics    Alcohol use: No        Family History:  family history includes Heart Disease in his father; Stroke in his mother. Allergies:  Penicillins     Home Medications:  Prior to Visit Medications    Medication Sig Taking?  Authorizing Provider   coenzyme Q10 100 MG CAPS capsule Take 100 mg by mouth daily Yes Historical Provider, MD   ELIQUIS 5 MG TABS tablet TAKE ONE TABLET BY MOUTH TWICE A DAY Yes Yuli Horne MD   ferrous sulfate 325 (65 Fe) MG tablet Take 325 mg by mouth daily (with breakfast) Yes Historical Provider, MD   Multiple Vitamins-Minerals (MULTIVITAL) TABS Take by mouth Yes Historical Provider, MD   vitamin B-12 (CYANOCOBALAMIN) 1000 MCG tablet Take 1,000 mcg by mouth daily Yes Historical Provider, MD   lisinopril (PRINIVIL;ZESTRIL) 20 MG tablet Take 1 tablet by mouth daily  Patient taking differently: Take 40 mg by mouth daily  Yes Martha Smith MD   amLODIPine (NORVASC) 5 MG tablet Take 1 tablet by mouth daily  Patient taking differently: Take 10 mg by mouth daily  Yes Martha Smith MD   atorvastatin (LIPITOR) 10 MG tablet Take 0.5 tablets by mouth every other day  Patient taking differently: Take 5 mg by mouth Mon Wed Fri Yes Martha Smith MD   isosorbide mononitrate (IMDUR) 60 MG CR tablet Take 60 mg by mouth daily. Yes Historical Provider, MD   furosemide (LASIX) 20 MG tablet Take 1 tablet by mouth daily. Patient taking differently: Take 40 mg by mouth daily  Yes Martha Smith MD   terazosin (HYTRIN) 10 MG capsule Take 10 mg by mouth nightly. Yes Historical Provider, MD   triamcinolone (NASACORT AQ) 55 MCG/ACT nasal inhaler 2 sprays by Nasal route daily. Yes Historical Provider, MD   metformin (GLUCOPHAGE) 500 MG tablet Take 500 mg by mouth daily (with breakfast)  Yes Historical Provider, MD       [x] Medications and dosages reviewed.     ROS:  [x]Full ROS obtained and negative except as mentioned in HPI      Physical Examination:    Vitals:    05/07/21 0914   Weight: 257 lb (116.6 kg)   Height: 5' 6\" (1.676 m)        · GENERAL: Elderly male in No acute distress  · NEUROLOGICAL: Alert and oriented  · PSYCH: Calm affect  · SKIN: Warm and dry, No visible rash,   · EYES: Pupils equal and round, Sclera non-icteric,   · HENT:  External ears and nose unremarkable, mucus membranes moist  · MUSCULOSKELETAL: Normal cephalic, neck supple  · CAROTID: Normal upstroke, no bruits  · CARDIAC: JVP normal, Normal PMI, regular rate and rhythm, normal S1S2, no

## 2022-03-30 ENCOUNTER — TELEPHONE (OUTPATIENT)
Dept: CARDIOLOGY CLINIC | Age: 87
End: 2022-03-30

## 2022-03-30 NOTE — TELEPHONE ENCOUNTER
Looked through records available. Can not find diagnosis of CHF in any OV notes or testing. Reviewed last echo results with him. He is seeing a new cardiologist trough [facility].

## 2022-03-30 NOTE — TELEPHONE ENCOUNTER
Pt is asking what the date was that he was dx with CHF. Also asking for a release of record form to be mailed to his home address.  [id number]

## 2022-03-31 NOTE — TELEPHONE ENCOUNTER
I tried to call patient but he didn't answer and voice mail is not set up. He doesn't need a release for continuity of care. All we need is the fax number of where to send his records. I will attempt to reach him by phone today. If I don't succeed, I will mail him the information we need.

## 2022-04-01 NOTE — TELEPHONE ENCOUNTER
Spoke with patient. He doesn't need a release. He asked me to look through his chart for the CHF dx. I went through his old chart page by page. I only found CAD, HTN, hyperlipidemia, aortic valve disease, and sleep apnea. I printed the office notes from old and current charts then mailed them to his home address.